# Patient Record
Sex: FEMALE | Race: WHITE | Employment: OTHER | URBAN - METROPOLITAN AREA
[De-identification: names, ages, dates, MRNs, and addresses within clinical notes are randomized per-mention and may not be internally consistent; named-entity substitution may affect disease eponyms.]

---

## 2017-01-04 ENCOUNTER — HOSPITAL ENCOUNTER (OUTPATIENT)
Dept: CT IMAGING | Age: 82
Discharge: HOME | End: 2017-01-04
Attending: SPECIALIST | Admitting: SPECIALIST

## 2017-01-04 DIAGNOSIS — N28.89 OTHER SPECIFIED DISORDER OF KIDNEY AND URETER: ICD-10-CM

## 2017-01-04 DIAGNOSIS — N28.89 OTHER SPECIFIED DISORDERS OF KIDNEY AND URETER: ICD-10-CM

## 2017-01-04 DIAGNOSIS — C64.9 MALIGNANT NEOPLASM OF KIDNEY, EXCEPT PELVIS: ICD-10-CM

## 2017-01-04 RX ORDER — SODIUM CHLORIDE 0.9 % (FLUSH) 0.9 %
10 SYRINGE (ML) INJECTION ONCE
Status: COMPLETED | OUTPATIENT
Start: 2017-01-04 | End: 2017-01-04

## 2017-01-04 RX ADMIN — Medication 10 ML: at 08:22

## 2017-05-17 ENCOUNTER — HOSPITAL ENCOUNTER (OUTPATIENT)
Dept: WOMENS IMAGING | Age: 82
Discharge: HOME | End: 2017-05-17
Attending: GENERAL PRACTICE | Admitting: GENERAL PRACTICE

## 2017-05-17 DIAGNOSIS — G89.29 CHRONIC NECK PAIN: ICD-10-CM

## 2017-05-17 DIAGNOSIS — M54.2 CHRONIC NECK PAIN: ICD-10-CM

## 2017-05-17 DIAGNOSIS — Z13.820 SCREENING FOR OSTEOPOROSIS: ICD-10-CM

## 2017-06-22 ENCOUNTER — HOSPITAL ENCOUNTER (OUTPATIENT)
Dept: GENERAL RADIOLOGY | Age: 82
Discharge: HOME | End: 2017-06-22
Attending: GENERAL PRACTICE | Admitting: GENERAL PRACTICE

## 2017-06-22 DIAGNOSIS — R05.8 PRODUCTIVE COUGH: ICD-10-CM

## 2017-12-15 ENCOUNTER — HOSPITAL ENCOUNTER (OUTPATIENT)
Dept: CT IMAGING | Age: 82
Discharge: HOME | End: 2017-12-15
Attending: SPECIALIST | Admitting: SPECIALIST

## 2017-12-15 DIAGNOSIS — N28.89 KIDNEY MASS: ICD-10-CM

## 2017-12-15 DIAGNOSIS — C64.9 RENAL CELL CARCINOMA, UNSPECIFIED LATERALITY (HCC): ICD-10-CM

## 2017-12-15 LAB
BASOPHILS ABSOLUTE: 0 K/CU MM
BASOPHILS RELATIVE PERCENT: 0.8 % (ref 0–1)
DIFFERENTIAL TYPE: ABNORMAL
EOSINOPHILS ABSOLUTE: 0.3 K/CU MM
EOSINOPHILS RELATIVE PERCENT: 4.8 % (ref 0–3)
GFR AFRICAN AMERICAN: >60 ML/MIN/1.73M2
GFR NON-AFRICAN AMERICAN: 56 ML/MIN/1.73M2
HCT VFR BLD CALC: 44.7 % (ref 37–47)
HEMOGLOBIN: 14.6 GM/DL (ref 12.5–16)
IMMATURE NEUTROPHIL %: 0.2 % (ref 0–0.43)
LYMPHOCYTES ABSOLUTE: 1.3 K/CU MM
LYMPHOCYTES RELATIVE PERCENT: 25.4 % (ref 24–44)
MCH RBC QN AUTO: 29.3 PG (ref 27–31)
MCHC RBC AUTO-ENTMCNC: 32.7 % (ref 32–36)
MCV RBC AUTO: 89.8 FL (ref 78–100)
MONOCYTES ABSOLUTE: 0.5 K/CU MM
MONOCYTES RELATIVE PERCENT: 9.9 % (ref 0–4)
NUCLEATED RBC %: 0 %
PDW BLD-RTO: 13.3 % (ref 11.7–14.9)
PLATELET # BLD: 217 K/CU MM (ref 140–440)
PMV BLD AUTO: 9.1 FL (ref 7.5–11.1)
POC CREATININE: 1 MG/DL (ref 0.6–1.1)
RBC # BLD: 4.98 M/CU MM (ref 4.2–5.4)
SEGMENTED NEUTROPHILS ABSOLUTE COUNT: 3.1 K/CU MM
SEGMENTED NEUTROPHILS RELATIVE PERCENT: 58.9 % (ref 36–66)
TOTAL IMMATURE NEUTOROPHIL: 0.01 K/CU MM
TOTAL NUCLEATED RBC: 0 K/CU MM
WBC # BLD: 5.2 K/CU MM (ref 4–10.5)

## 2018-01-11 ENCOUNTER — HOSPITAL ENCOUNTER (OUTPATIENT)
Dept: ULTRASOUND IMAGING | Age: 83
Discharge: HOME | End: 2018-01-11
Attending: SPECIALIST | Admitting: SPECIALIST

## 2018-01-11 DIAGNOSIS — N83.9 NONINFLAMMATORY DISORDER OF OVARY, FALLOPIAN TUBE AND BROAD LIGAMENT, UNSPECIFIED (CODE): ICD-10-CM

## 2018-05-02 ENCOUNTER — HOSPITAL ENCOUNTER (OUTPATIENT)
Dept: GENERAL RADIOLOGY | Age: 83
Discharge: HOME | End: 2018-05-02
Attending: GENERAL PRACTICE | Admitting: GENERAL PRACTICE

## 2018-05-02 DIAGNOSIS — E78.5 HYPERLIPIDEMIA, UNSPECIFIED HYPERLIPIDEMIA TYPE: ICD-10-CM

## 2018-05-02 DIAGNOSIS — R22.1 MASS OF NECK: ICD-10-CM

## 2018-05-02 DIAGNOSIS — Z85.820 H/O MALIGNANT MELANOMA: ICD-10-CM

## 2023-04-18 ENCOUNTER — HOSPITAL ENCOUNTER (OUTPATIENT)
Dept: RADIATION ONCOLOGY | Age: 88
Discharge: HOME | End: 2023-04-18
Payer: MEDICARE

## 2023-04-18 VITALS
TEMPERATURE: 97.2 F | DIASTOLIC BLOOD PRESSURE: 65 MMHG | HEART RATE: 76 BPM | WEIGHT: 143.6 LBS | BODY MASS INDEX: 28.19 KG/M2 | HEIGHT: 60 IN | OXYGEN SATURATION: 99 % | SYSTOLIC BLOOD PRESSURE: 138 MMHG

## 2023-04-18 DIAGNOSIS — C54.1 ADENOCARCINOMA OF ENDOMETRIUM (HCC): ICD-10-CM

## 2023-04-18 PROCEDURE — 99205 OFFICE O/P NEW HI 60 MIN: CPT | Performed by: RADIOLOGY

## 2023-04-18 PROCEDURE — 99211 OFF/OP EST MAY X REQ PHY/QHP: CPT

## 2023-04-18 RX ORDER — DORZOLAMIDE HCL 20 MG/ML
2 SOLUTION/ DROPS OPHTHALMIC 3 TIMES DAILY
COMMUNITY

## 2023-04-18 RX ORDER — TRAMADOL HYDROCHLORIDE 50 MG/1
50 TABLET ORAL EVERY 6 HOURS PRN
COMMUNITY

## 2023-04-18 RX ORDER — FAMOTIDINE 10 MG
10 TABLET ORAL 2 TIMES DAILY
COMMUNITY

## 2023-04-18 NOTE — CONSULTS
Gait unremarkable. The patient is alert and oriented. CN II-XII are grossly intact. Sensation to light touch is intact and symmetric in the fingers and feet. Muscle strength is 5/5 in both the upper and lower extremities. IMPRESSION/PLAN:  Annamarie Og is a 80 y.o. 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 I have reviewed the patient's radiographic images. The treatment options were discussed in detail with the patient. I do believe she would be best served with vaginal cuff HDR brachytherapy. The potential acute side effects and chronic complications of radiation therapy to the vaginal cuff were discussed in detail with the patient. The patient voiced understanding, and the patient's questions were answered. The patient has decided to proceed with radiation therapy. I will tentatively schedule a simulation to occur in 2 weeks, after her recheck next week with Dr. Keny Taylor and would plan on initiating radiation therapy shortly thereafter. Thank-you for allowing me to participate in the care of this very pleasant patient.           MEDICAL DECISION MAKING    Number/Complexity of Problems Addressed  [] 1 self-limited of minor problem (87969/62599)  [] >=2 self-limited or minor problems, 1 stable chronic illness, 1 acute/uncomplicated illness/injury (02636/10570)  [x]Chronic illnesses with exacerbation/progression/side effects of treatment, >=2 stable chronic illnesses, 1 undiagnosed new problem with uncertain prognosis, 1 acute illness with systemic symptoms, 1 acute complicated injury (94445/57132)  []Chronic illnesses with severe exacerbation/progression/treatment side effects, acute or chronic illness or injury that poses a threat to life or bodily function (96973/95338)    Amount and/or Complexity of Data Reviewed  [] Minimal or none (60484/58384)  [] Limited -

## 2023-04-27 ENCOUNTER — TELEPHONE (OUTPATIENT)
Dept: ONCOLOGY | Age: 88
End: 2023-04-27

## 2023-04-27 NOTE — TELEPHONE ENCOUNTER
Call placed to pt to follow up regarding surgical clearance. Follow up note from Terrence Syed CNP reviewed per Dr. Yamilex Mojica. Pt scheduled for CT/SIM for radiation planning on May 9, 2023 at 1:00 PM at [facility]. Direct contact info given, advised pt call with any questions or concerns- voices understanding of above.

## 2023-05-09 ENCOUNTER — HOSPITAL ENCOUNTER (OUTPATIENT)
Dept: RADIATION ONCOLOGY | Age: 88
Discharge: HOME | End: 2023-05-09
Payer: MEDICARE

## 2023-05-09 PROCEDURE — 77332 RADIATION TREATMENT AID(S): CPT | Performed by: RADIOLOGY

## 2023-05-09 PROCEDURE — 77290 THER RAD SIMULAJ FIELD CPLX: CPT | Performed by: RADIOLOGY

## 2023-05-09 PROCEDURE — 77470 SPECIAL RADIATION TREATMENT: CPT | Performed by: RADIOLOGY

## 2023-05-09 PROCEDURE — 77263 THER RADIOLOGY TX PLNG CPLX: CPT | Performed by: RADIOLOGY

## 2023-05-10 PROCEDURE — 77295 3-D RADIOTHERAPY PLAN: CPT | Performed by: RADIOLOGY

## 2023-05-10 PROCEDURE — 77300 RADIATION THERAPY DOSE PLAN: CPT | Performed by: RADIOLOGY

## 2023-05-16 ENCOUNTER — HOSPITAL ENCOUNTER (OUTPATIENT)
Dept: RADIATION ONCOLOGY | Age: 88
Discharge: HOME | End: 2023-05-16
Payer: MEDICARE

## 2023-05-16 PROCEDURE — 77770 HDR RDNCL NTRSTL/ICAV BRCHTX: CPT | Performed by: RADIOLOGY

## 2023-05-16 PROCEDURE — 99999 PR OFFICE/OUTPT VISIT,PROCEDURE ONLY: CPT | Performed by: RADIOLOGY

## 2023-05-17 ENCOUNTER — APPOINTMENT (OUTPATIENT)
Dept: RADIATION ONCOLOGY | Age: 88
End: 2023-05-17
Payer: MEDICARE

## 2023-05-18 ENCOUNTER — APPOINTMENT (OUTPATIENT)
Dept: RADIATION ONCOLOGY | Age: 88
End: 2023-05-18
Payer: MEDICARE

## 2023-05-23 ENCOUNTER — HOSPITAL ENCOUNTER (OUTPATIENT)
Dept: RADIATION ONCOLOGY | Age: 88
Discharge: HOME | End: 2023-05-23
Payer: MEDICARE

## 2023-05-23 ENCOUNTER — APPOINTMENT (OUTPATIENT)
Dept: RADIATION ONCOLOGY | Age: 88
End: 2023-05-23
Payer: MEDICARE

## 2023-05-23 PROCEDURE — 99999 PR OFFICE/OUTPT VISIT,PROCEDURE ONLY: CPT | Performed by: RADIOLOGY

## 2023-05-23 PROCEDURE — 77770 HDR RDNCL NTRSTL/ICAV BRCHTX: CPT | Performed by: RADIOLOGY

## 2023-05-30 ENCOUNTER — HOSPITAL ENCOUNTER (OUTPATIENT)
Dept: RADIATION ONCOLOGY | Age: 88
Discharge: HOME | End: 2023-05-30
Payer: MEDICARE

## 2023-05-30 PROCEDURE — 77770 HDR RDNCL NTRSTL/ICAV BRCHTX: CPT | Performed by: RADIOLOGY

## 2023-05-30 PROCEDURE — 77427 RADIATION TX MANAGEMENT X5: CPT | Performed by: RADIOLOGY

## 2023-05-30 PROCEDURE — 77336 RADIATION PHYSICS CONSULT: CPT | Performed by: RADIOLOGY

## 2023-05-30 PROCEDURE — 99999 PR OFFICE/OUTPT VISIT,PROCEDURE ONLY: CPT | Performed by: RADIOLOGY

## 2023-05-30 NOTE — PROGRESS NOTES
Weekly Radiation Treatment Progress Note    DATE OF SERVICE: 5/30/2023     DIAGNOSIS:   Cancer Staging   Adenocarcinoma of endometrium [facility])  Staging form: Corpus Uteri - Carcinoma And Carcinosarcoma, AJCC 8th Edition  - Pathologic stage from 3/16/2023: FIGO Stage IA (pT1a, pN0, cM0) - Signed by uYe Bar MD on 4/18/2023       TREATMENT COURSE:   Oncology History   Adenocarcinoma of endometrium ([address]   3/16/2023 -  Cancer Staged    Staging form: Corpus Uteri - Carcinoma And Carcinosarcoma, AJCC 8th Edition  - Pathologic stage from 3/16/2023: FIGO Stage IA (pT1a, pN0, cM0)       3/16/2023 Surgery    Total robotic hysterectomy, bilateral salpingo-oophorectomy, bilateral pelvic sentinel lymph node dissection           Site: Vaginal Cuff   Current Total Radiation Dose: 2100 cGy HDR    Pt doing well. Energy good.   No dysuria  No diarrhea    EXAM  Wt Readings from Last 3 Encounters:   04/18/23 143 lb 9.6 oz (65.1 kg)   07/07/18 140 lb (63.5 kg)   06/25/16 150 lb (68 kg)     NAD    Setup images, chart, plan reviewed    A/P:   Tolerating RT well  RV 1mo      Electronically signed by Yue Bar MD on 5/30/2023 at 11:33 AM

## 2023-05-30 NOTE — PROGRESS NOTES
Radiation Oncology  Treatment Completion Summary  Encounter Date: 2023 11:35 AM    Ms. Romy Zepeda is a 80 y.o. female  : 10/22/1932  MRN: [id number]  [facility] Number: [de-identified]      FOLLOW UP PHYSICIANS:   MD Esme Sheffield MD Marlana Cocks, MD           DIAGNOSIS: Endometrioid adenocarcinoma the endometrium    Cancer Staging   Adenocarcinoma of endometrium [facility])  Staging form: Corpus Uteri - Carcinoma And Carcinosarcoma, AJCC 8th Edition  - Pathologic stage from 3/16/2023: FIGO Stage IA (pT1a, pN0, cM0) - Signed by Torsten Jaimes MD on 2023         TREATMENT COURSE:   Oncology History   Adenocarcinoma of endometrium ([facility] [address]   3/16/2023 -  Cancer Staged    Staging form: Corpus Uteri - Carcinoma And Carcinosarcoma, AJCC 8th Edition  - Pathologic stage from 3/16/2023: FIGO Stage IA (pT1a, pN0, cM0)       3/16/2023 Surgery    Total robotic hysterectomy, bilateral salpingo-oophorectomy, bilateral pelvic sentinel lymph node dissection     2023 - 2023 Radiation    HDR iridium 192 source  Vaginal cuff: 2100 cGy in 3 fractions         HISTORY:  Romy Zepeda is a 80 y.o. female with the above referenced diagnosis. Complete details on history of present illness please see my initial consultation note. The patient has recently completed a course of HDR vaginal cuff radiation therapy with Ir 192 source and what follows is a description of the treatments received:    ANATOMIC SITE: Vaginal cuff  Radioactive isotope: HDR iridium 192 source  Cylinder size: 3 cm all segments  DOSE PER FRACTION: 700 cGy  TOTAL DOSE: 2100 cGy    ELAPSED DAYS: 21    TREATMENT TOLERANCE:   Overall, the patient tolerated radiation therapy quite well. The patient's energy level was fairly well-maintained throughout the course of treatments. No significant skin erythema developed. No dysuria or diarrhea.     FOLLOW-UP PLANS:   The patient is to return to see me in 1 month or to

## 2023-06-26 ENCOUNTER — HOSPITAL ENCOUNTER (OUTPATIENT)
Dept: RADIATION ONCOLOGY | Age: 88
Discharge: HOME | End: 2023-06-26

## 2023-06-26 VITALS
TEMPERATURE: 97.7 F | DIASTOLIC BLOOD PRESSURE: 60 MMHG | HEIGHT: 60 IN | HEART RATE: 73 BPM | OXYGEN SATURATION: 100 % | BODY MASS INDEX: 27.92 KG/M2 | SYSTOLIC BLOOD PRESSURE: 125 MMHG | WEIGHT: 142.2 LBS

## 2023-11-28 ENCOUNTER — APPOINTMENT (OUTPATIENT)
Dept: RADIATION ONCOLOGY | Age: 88
End: 2023-11-28
Payer: MEDICARE

## 2023-12-19 ENCOUNTER — APPOINTMENT (OUTPATIENT)
Dept: RADIATION ONCOLOGY | Age: 88
End: 2023-12-19
Payer: MEDICARE

## 2024-01-23 ENCOUNTER — HOSPITAL ENCOUNTER (OUTPATIENT)
Dept: RADIATION ONCOLOGY | Age: 89
Discharge: HOME | End: 2024-01-23
Payer: MEDICARE

## 2024-01-23 VITALS
TEMPERATURE: 97.7 F | DIASTOLIC BLOOD PRESSURE: 62 MMHG | SYSTOLIC BLOOD PRESSURE: 122 MMHG | HEIGHT: 60 IN | HEART RATE: 68 BPM | WEIGHT: 147.6 LBS | BODY MASS INDEX: 28.98 KG/M2 | OXYGEN SATURATION: 100 %

## 2024-01-23 PROCEDURE — 99211 OFF/OP EST MAY X REQ PHY/QHP: CPT

## 2024-01-23 PROCEDURE — 99214 OFFICE O/P EST MOD 30 MIN: CPT | Performed by: RADIOLOGY

## 2024-01-23 RX ORDER — VIBEGRON 75 MG/1
75 TABLET, FILM COATED ORAL DAILY
COMMUNITY

## 2024-01-23 NOTE — PROGRESS NOTES
[facility]   Radiation Oncology Center  [address] [place] [address]  Phone: [phone]  Fax: [phone]    RADIATION ONCOLOGY FOLLOW UP REPORT    PATIENT NAME:  Marcy Dahl              : 10/22/1932  MEDICAL RECORD NO: [id number]    CSN NO: [id number]        PROVIDER: Yazmin Barnes MD      DATE OF SERVICE: 2024     Other Physicians:  MD Phil Torres MD Juliet Elizabeth Wolford, MD      DIAGNOSIS:  Cancer Staging   Adenocarcinoma of endometrium ([facility])  Staging form: Corpus Uteri - Carcinoma And Carcinosarcoma, AJCC 8th Edition  - Pathologic stage from 3/16/2023: FIGO Stage IA (pT1a, pN0, cM0) - Signed by Yazmin Barnes MD on 2023       TREATMENT COURSE:   Oncology History   Adenocarcinoma of endometrium (H[facility])   3/16/2023 -  Cancer Staged    Staging form: Corpus Uteri - Carcinoma And Carcinosarcoma, AJCC 8th Edition  - Pathologic stage from 3/16/2023: FIGO Stage IA (pT1a, pN0, cM0)     3/16/2023 Surgery    Total robotic hysterectomy, bilateral salpingo-oophorectomy, bilateral pelvic sentinel lymph node dissection     2023 - 2023 Radiation    HDR iridium 192 source  Vaginal cuff: 2100 cGy in 3 fractions             HPI:   Marcy Dahl is a 89 y.o. female who has a history as above who returns today for routine follow-up visit.  She was last seen by me in 2023, and was doing well at that time without evidence of recurrent or metastatic disease.  Her most recent mammogram was obtained bilaterally at [facility] which she reports was unremarkable.  She reports she has not had a recent breast examination by physician    Currently, she reports has been doing well.  Her energy level has been good.  She denies any recent surgeries or changes in her health otherwise.  Overall, her energy levels been good.  She has not been experiencing any fevers, chills, or drenching night sweats.  Her weight and appetite have

## 2024-01-23 NOTE — PROGRESS NOTES
Marcy Dahl  1/23/2024    Patient is seen today for follow up.     Vitals:    01/23/24 1008   BP: 122/62   Pulse: 68   Temp: 97.7 °F (36.5 °C)   SpO2: 100%        Oxygen Therapy  SpO2: 100 %  Pulse Oximetry Type: Intermittent  Pulse Oximeter Device Location: Finger    Wt Readings from Last 3 Encounters:   01/23/24 67 kg (147 lb 9.6 oz)   06/26/23 64.5 kg (142 lb 3.2 oz)   04/18/23 65.1 kg (143 lb 9.6 oz)       Pain Assessment  Pain Assessment: None - Denies Pain  Denies Need for Intervention     No Known Allergies     Current Outpatient Medications   Medication Sig Dispense Refill    vibegron (GEMTESA) 75 MG TABS tablet Take 1 tablet by mouth daily      famotidine (PEPCID) 10 MG tablet Take 1 tablet by mouth 2 times daily      dorzolamide (TRUSOPT) 2 % ophthalmic solution 2 drops 3 times daily      Multiple Vitamin (MULTI-DAY PO) Take by mouth      Ascorbic Acid (YESENIA-C PO) Take by mouth      Calcium Carbonate-Vit D-Min (CALCIUM 1200 PO) Take by mouth      ACETAMINOPHEN PO Take by mouth      traMADol (ULTRAM) 50 MG tablet Take 1 tablet by mouth every 6 hours as needed for Pain.      simvastatin (ZOCOR) 20 MG tablet Take 1 tablet by mouth nightly      Latanoprost (XALATAN OP) Apply  to eye.         No current facility-administered medications for this encounter.        Additional Comments: Patient here for a 5 month follow up.    Electronically signed by Maria C Arevalo MA on 1/23/2024 at 10:09 AM

## 2024-07-23 ENCOUNTER — HOSPITAL ENCOUNTER (OUTPATIENT)
Dept: RADIATION ONCOLOGY | Age: 89
Discharge: HOME | End: 2024-07-23
Payer: MEDICARE

## 2024-07-23 VITALS
DIASTOLIC BLOOD PRESSURE: 61 MMHG | OXYGEN SATURATION: 99 % | HEIGHT: 60 IN | SYSTOLIC BLOOD PRESSURE: 126 MMHG | WEIGHT: 146 LBS | TEMPERATURE: 97.1 F | BODY MASS INDEX: 28.66 KG/M2 | HEART RATE: 79 BPM

## 2024-07-23 PROCEDURE — 99214 OFFICE O/P EST MOD 30 MIN: CPT | Performed by: RADIOLOGY

## 2024-07-23 PROCEDURE — 99212 OFFICE O/P EST SF 10 MIN: CPT | Performed by: RADIOLOGY

## 2024-07-23 NOTE — PROGRESS NOTES
Marcy Dahl  7/23/2024    Patient is seen today for follow up.     Vitals:    07/23/24 0959   BP: 126/61   Pulse: 79   Temp: 97.1 °F (36.2 °C)   SpO2: 99%        Oxygen Therapy  SpO2: 99 %  Pulse Oximeter Device Mode: Intermittent  Pulse Oximeter Device Location: Finger  O2 Device: None (Room air)  Skin Assessment: Clean, dry, & intact    Wt Readings from Last 3 Encounters:   07/23/24 66.2 kg (146 lb)   01/23/24 67 kg (147 lb 9.6 oz)   06/26/23 64.5 kg (142 lb 3.2 oz)       Pain Assessment  Pain Assessment: None - Denies Pain  Denies Need for Intervention     No Known Allergies     Current Outpatient Medications   Medication Sig Dispense Refill    vibegron (GEMTESA) 75 MG TABS tablet Take 1 tablet by mouth daily      famotidine (PEPCID) 10 MG tablet Take 1 tablet by mouth 2 times daily      dorzolamide (TRUSOPT) 2 % ophthalmic solution 2 drops 3 times daily      Multiple Vitamin (MULTI-DAY PO) Take by mouth      Ascorbic Acid (YESENIA-C PO) Take by mouth      Calcium Carbonate-Vit D-Min (CALCIUM 1200 PO) Take by mouth      ACETAMINOPHEN PO Take by mouth      traMADol (ULTRAM) 50 MG tablet Take 1 tablet by mouth every 6 hours as needed for Pain.      simvastatin (ZOCOR) 20 MG tablet Take 1 tablet by mouth nightly      Latanoprost (XALATAN OP) Apply  to eye.         No current facility-administered medications for this encounter.        Additional Comments: Here for f/u and has no new concerns.     Electronically signed by Jessie Wilkerson CMA on 7/23/2024 at 10:01 AM             
Refill    vibegron (GEMTESA) 75 MG TABS tablet Take 1 tablet by mouth daily      famotidine (PEPCID) 10 MG tablet Take 1 tablet by mouth 2 times daily      dorzolamide (TRUSOPT) 2 % ophthalmic solution 2 drops 3 times daily      Multiple Vitamin (MULTI-DAY PO) Take by mouth      Ascorbic Acid (YESENIA-C PO) Take by mouth      Calcium Carbonate-Vit D-Min (CALCIUM 1200 PO) Take by mouth      ACETAMINOPHEN PO Take by mouth      traMADol (ULTRAM) 50 MG tablet Take 1 tablet by mouth every 6 hours as needed for Pain.      simvastatin (ZOCOR) 20 MG tablet Take 1 tablet by mouth nightly      Latanoprost (XALATAN OP) Apply  to eye.         No current facility-administered medications for this encounter.       EXAMINATION:   Vitals:    07/23/24 0959   BP: 126/61   Pulse: 79   Temp: 97.1 °F (36.2 °C)   SpO2: 99%     The patient is in no acute distress.  Neck: Supple no preauricular postauricular, submental, submandibular, cervical, supraclavicular, or infraclavicular lymphadenopathy is present.  Heart: Regular rate and rhythm.  No murmurs, clicks, or gallops are present.  Lungs: Bilaterally clear to auscultation.  No rales, rhonchi, or wheezing are present.  Abdomen: Soft, nontender and nondistended.  No hepatosplenomegaly or masses are appreciated.  Pelvic examination: External genitalia is within normal limits.  Vaginal apex is well-healed.  No visible lesions.  On bimanual exam, no masses are palpable.  Mild fibrosis at the vaginal apex are present consistent with postradiation changes.  No nodularity is noted.  The rectovaginal septum is within normal limits.  Extremities: No cyanosis, clubbing, or edema is present.    ASSESSMENT AND PLAN:     Marcy Dahl is a 89 y.o. female who has a history of a stage Ia adenocarcinoma the endometrium status post total robotic hysterectomy, BSO and vaginal cuff HDR brachytherapy who is now approximately 1 years after completion of her vaginal cuff brachtherapy who is doing well at this

## 2025-07-29 ENCOUNTER — APPOINTMENT (OUTPATIENT)
Dept: RADIATION ONCOLOGY | Age: 89
End: 2025-07-29
Payer: MEDICARE

## 2025-07-29 ENCOUNTER — HOSPITAL ENCOUNTER (OUTPATIENT)
Dept: RADIATION ONCOLOGY | Age: 89
Discharge: HOME | End: 2025-07-29
Payer: MEDICARE

## 2025-07-29 VITALS
OXYGEN SATURATION: 97 % | HEIGHT: 61 IN | WEIGHT: 144 LBS | TEMPERATURE: 98.4 F | SYSTOLIC BLOOD PRESSURE: 132 MMHG | HEART RATE: 64 BPM | DIASTOLIC BLOOD PRESSURE: 88 MMHG | BODY MASS INDEX: 27.19 KG/M2

## 2025-07-29 PROCEDURE — 99214 OFFICE O/P EST MOD 30 MIN: CPT | Performed by: RADIOLOGY

## 2025-07-29 PROCEDURE — 99212 OFFICE O/P EST SF 10 MIN: CPT | Performed by: RADIOLOGY

## 2025-07-29 NOTE — PROGRESS NOTES
Marcy GUTIERREZ Nabila  7/29/2025    Patient is seen today for follow up.     Vitals:    07/29/25 1121   BP: 132/88   Pulse: 64   Temp: 98.4 °F (36.9 °C)   SpO2: 97%        Oxygen Therapy  SpO2: 97 %  Pulse Oximeter Device Location: Finger    Wt Readings from Last 3 Encounters:   07/29/25 65.3 kg (144 lb)   07/23/24 66.2 kg (146 lb)   01/23/24 67 kg (147 lb 9.6 oz)       Pain Assessment  Pain Assessment: None - Denies Pain  Denies Need for Intervention     No Known Allergies     Current Outpatient Medications   Medication Sig Dispense Refill    vibegron (GEMTESA) 75 MG TABS tablet Take 1 tablet by mouth daily      famotidine (PEPCID) 10 MG tablet Take 1 tablet by mouth 2 times daily      dorzolamide (TRUSOPT) 2 % ophthalmic solution 2 drops 3 times daily      Multiple Vitamin (MULTI-DAY PO) Take by mouth      Ascorbic Acid (YESENIA-C PO) Take by mouth      Calcium Carbonate-Vit D-Min (CALCIUM 1200 PO) Take by mouth      ACETAMINOPHEN PO Take by mouth      traMADol (ULTRAM) 50 MG tablet Take 1 tablet by mouth every 6 hours as needed for Pain.      simvastatin (ZOCOR) 20 MG tablet Take 1 tablet by mouth nightly      Latanoprost (XALATAN OP) Apply  to eye.         No current facility-administered medications for this encounter.        Additional Comments: Patient states no concerns at this time.     Electronically signed by Valery Apodaca MA on 7/29/2025 at 11:25 AM

## 2025-07-29 NOTE — PROGRESS NOTES
[facility]   Radiation Oncology Center  [address] [place] [address]  Phone: [phone]  Fax: [phone]    RADIATION ONCOLOGY FOLLOW UP REPORT    PATIENT NAME:  Marcy Dahl              : 10/22/1932  MEDICAL RECORD NO: [id number]    CSN NO: [id number]        PROVIDER: Yazmin Barnes MD      DATE OF SERVICE: 2025     FOLLOW UP PHYSICIANS:  MD Phil Torres MD Juliet Elizabeth Wolford, MD Angela Jackson, MD Ananth Annamraju, M.D.  [address]  [place] [address]      DIAGNOSIS: Cancer Staging   Adenocarcinoma of endometrium (HCC)  Staging form: Corpus Uteri - Carcinoma And Carcinosarcoma, AJCC 8th Edition  - Pathologic stage from 3/16/2023: FIGO Stage IA (pT1a, pN0, cM0) - Signed by Yazmin Barnes MD on 2023         TREATMENT COURSE:   Oncology History   Adenocarcinoma of endometrium (HCC)   3/16/2023 -  Cancer Staged    Staging form: Corpus Uteri - Carcinoma And Carcinosarcoma, AJCC 8th Edition  - Pathologic stage from 3/16/2023: FIGO Stage IA (pT1a, pN0, cM0)     3/16/2023 Surgery    Total robotic hysterectomy, bilateral salpingo-oophorectomy, bilateral pelvic sentinel lymph node dissection     2023 - 2023 Radiation    HDR iridium 192 source  Vaginal cuff: 2100 cGy in 3 fractions         HPI:   Marcy Dahl is a 89 y.o. female who has a history as above of endometrial cancer as well as a history of renal cell carcinoma status post right nephrectomy in  who returns today for routine follow-up visit.  The patient was last seen by me in 2024 and was doing well at that time without evidence of recurrent or metastatic disease.  She last saw Dr. Mathews and had a pelvic examination in 2024.    Currently, the patient reports to be doing well.  Energy level has been fairly good overall.  The patient denies any fevers, chills, or drenching night sweats.  Weight and appetite

## 2025-07-31 ENCOUNTER — APPOINTMENT (OUTPATIENT)
Dept: RADIATION ONCOLOGY | Age: 89
End: 2025-07-31
Payer: MEDICARE